# Patient Record
Sex: FEMALE | Race: WHITE | NOT HISPANIC OR LATINO | Employment: OTHER | URBAN - METROPOLITAN AREA
[De-identification: names, ages, dates, MRNs, and addresses within clinical notes are randomized per-mention and may not be internally consistent; named-entity substitution may affect disease eponyms.]

---

## 2019-05-28 ENCOUNTER — MEDICAL CORRESPONDENCE (OUTPATIENT)
Dept: HEALTH INFORMATION MANAGEMENT | Facility: CLINIC | Age: 29
End: 2019-05-28

## 2019-09-13 ENCOUNTER — MEDICAL CORRESPONDENCE (OUTPATIENT)
Dept: HEALTH INFORMATION MANAGEMENT | Facility: CLINIC | Age: 29
End: 2019-09-13

## 2019-10-15 ENCOUNTER — TELEPHONE (OUTPATIENT)
Dept: PEDIATRICS | Facility: CLINIC | Age: 29
End: 2019-10-15

## 2019-10-28 ENCOUNTER — OFFICE VISIT (OUTPATIENT)
Dept: PEDIATRICS | Facility: CLINIC | Age: 29
End: 2019-10-28
Attending: GENETIC COUNSELOR, MS
Payer: COMMERCIAL

## 2019-10-28 DIAGNOSIS — Z86.69 HISTORY OF CHIARI MALFORMATION: ICD-10-CM

## 2019-10-28 DIAGNOSIS — F79 INTELLECTUAL DISABILITY: ICD-10-CM

## 2019-10-28 DIAGNOSIS — Q76.1 KLIPPEL-FEIL SYNDROME: ICD-10-CM

## 2019-10-28 DIAGNOSIS — Z86.69 H/O MOVEMENT DISORDER: ICD-10-CM

## 2019-10-28 DIAGNOSIS — F84.0 AUTISM: ICD-10-CM

## 2019-10-28 DIAGNOSIS — R62.52 SHORT STATURE: ICD-10-CM

## 2019-10-28 DIAGNOSIS — R62.50 DEVELOPMENTAL DELAY: ICD-10-CM

## 2019-10-28 DIAGNOSIS — Z71.83 ENCOUNTER FOR NONPROCREATIVE GENETIC COUNSELING: ICD-10-CM

## 2019-10-28 DIAGNOSIS — N25.89 RENAL TUBULAR ACIDOSIS: ICD-10-CM

## 2019-10-28 DIAGNOSIS — Z87.898 HISTORY OF SEIZURES: Primary | ICD-10-CM

## 2019-10-28 DIAGNOSIS — E87.20 ACIDOSIS: Primary | ICD-10-CM

## 2019-10-28 DIAGNOSIS — E55.9 VITAMIN D DEFICIENCY: Primary | ICD-10-CM

## 2019-10-28 LAB
ANION GAP SERPL CALCULATED.3IONS-SCNC: 7 MMOL/L (ref 3–14)
BUN SERPL-MCNC: 18 MG/DL (ref 7–30)
CALCIUM SERPL-MCNC: 9.8 MG/DL (ref 8.5–10.1)
CHLORIDE SERPL-SCNC: 103 MMOL/L (ref 94–109)
CO2 SERPL-SCNC: 31 MMOL/L (ref 20–32)
CREAT SERPL-MCNC: 0.49 MG/DL (ref 0.52–1.04)
GFR SERPL CREATININE-BSD FRML MDRD: >90 ML/MIN/{1.73_M2}
GLUCOSE SERPL-MCNC: 99 MG/DL (ref 70–99)
POTASSIUM SERPL-SCNC: 4 MMOL/L (ref 3.4–5.3)
SODIUM SERPL-SCNC: 141 MMOL/L (ref 133–144)

## 2019-10-28 PROCEDURE — 40000803 ZZHCL STATISTIC DNA ISOL HIGH PURITY: Performed by: PEDIATRICS

## 2019-10-28 PROCEDURE — 36415 COLL VENOUS BLD VENIPUNCTURE: CPT | Performed by: PEDIATRICS

## 2019-10-28 PROCEDURE — 82306 VITAMIN D 25 HYDROXY: CPT | Performed by: INTERNAL MEDICINE

## 2019-10-28 PROCEDURE — 96040 ZZH GENETIC COUNSELING, EACH 30 MINUTES: CPT | Mod: ZF | Performed by: GENETIC COUNSELOR, MS

## 2019-10-28 PROCEDURE — 80048 BASIC METABOLIC PNL TOTAL CA: CPT | Performed by: INTERNAL MEDICINE

## 2019-10-28 NOTE — PROVIDER NOTIFICATION
10/28/19 1548   Child Life   [facility])   Intervention Supportive Check In;Family Support   Procedure Support Comment Lab draw for patients coping plan: Father lays over patient's legs & mom sings to her while supporting her upper body. Parents decline any additional support. Required another strong lab campos.    Family Support Comment Parents accompanied patient. Family is from [facility].    Concerns About Development yes  (Patient has developmental delays, autism, movement disorder. )   Anxiety Severe Anxiety   Able to Shift Focus From Anxiety Difficult   Outcomes/Follow Up Continue to Follow/Support

## 2019-10-28 NOTE — PROVIDER NOTIFICATION
10/28/19 1548   Child Life   [facility])   Intervention Supportive Check In;Family Support   Procedure Support Comment Lab draw for patients coping plan: Father lays over patient's legs & mom sings to her while supporting her upper body. Parents decline any additional support. Required another strong lab campos.    Family Support Comment Parents accompanied patient. Family is from [facility].    Concerns About Development yes  (Patient has developmental delays, autism, movement disorder. )   Anxiety Severe Anxiety   Able to Shift Focus From Anxiety Difficult   Outcomes/Follow Up Continue to Follow/Support

## 2019-10-29 LAB
COPATH REPORT: NORMAL
DEPRECATED CALCIDIOL+CALCIFEROL SERPL-MC: 77 UG/L (ref 20–75)

## 2019-10-29 NOTE — PROGRESS NOTES
"Presenting Information:  Natalya Hewitt is a 29-year-old woman with a history of short stature, intellectual disability, seizures, Klippel-Feil anomaly, and a Chiari malformation.  I initially saw Natalya in 2016 with Dr. Davey Doshi, at which time genetic testing was recommended but declined by the family.  Since that time, Natalya's parents are now feeling ready to pursue additional evaluations and therefore returned for genetic counseling and testing today.  During our visit a personal and family history was updated, possible genetic contributions to Natalya's challenges were reviewed, and informed consent for genetic testing was obtained.     Personal History:  Natalya was born after a normal, health pregnancy.  Concern for her development began around 6-9 months of age when her milestones were delayed.  Natalya currently has limited speech, knowing a few words she will say when prompted.  She has been given a diagnosis of autism.  She is able to walk independently but has an abnormal gait.  Natalya has never had any regression.  In childhood she had chronic ear and sinus infections.  She also had asthma when she was younger but this has resolved.  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  She had her first period at 19 years of age.  Natalya's height is 4'4\".  She has had abnormal EEGs and has a history of seizures.  Since the family's last visit Natalya has experienced a grand mal seizure after a long period with no obvious seizures.  She is now on Keppra.  Another new diagnoses since our last visit is renal tubular acidosis.       Family History:  A detailed pedigree was obtained previously  No significant updates were reported today.      Discussion:  Today I reviewed with the family that  genes are long stretches of DNA that are responsible for how our bodies look and how our bodies work.  Our genes are inherited on structures called chromosomes of which we " have 23 pairs.  The first 22 pairs are the same in males and females while the 23rd pair, the sex chromosomes (X and Y), are different in males and females.  Males have one copy of the X-chromosome and one copy of the Y-chromosome while females have two copies of the X-chromosome.  Changes in the DNA sequence of a gene, called mutations, as well as changes within the chromosomes can cause the signs and symptoms of a genetic condition.     Natalya has had a number of genetic tests previously to try to identify an underlying cause for her challenges.  This has included a karyotype, FISH for 15q11.2, fragile-X testing, and sequencing for Rett syndrome.  This testing has all returned normal.  At Natalya's last visit with myself and Dr. Doshi we recommended additional genetic testing.  We first recommended a chromosomal microarray (CGH and SNP) which is more detailed chromosome study than the karyotype done previously.  This looks for smaller missing or extra pieces of the chromosomes as well as areas of the chromosomes that are too similar to one another.  This is considered standard of care for an individual with intellectual disability, developmental delay, and/or autism, and this continues to be our first recommendation.  If the chromosomal microarray returns negative (normal), we will then recommend a panel of genes associated with intellectual disability including genes referred to as Angelman-like, which is consistent with Natalya's clinical features.      Genetic testing for Natalya is medically necessary as it will have implications for her health and healthcare. For example, it is possible that an underlying cause may also predispose Natalya to other health risks.  Knowing about these additional health risks can help us stay ahead of her healthcare to more appropriately screen for other complications.  Some diagnoses may also help guide treatment recommendations.  Secondly, discovering an underlying reason may  help predict the chance for other family members to have another child with similar healthcare needs.  Finally, having a specific underlying diagnosis may help Natalya receive the services she needs.      We reviewed the potential costs, limitations, and benefits of testing including the possibility of a positive, negative, or uncertain result and the family provided written informed consent for this.  If negative, I will recommend an updated visit with a  and likely whole exome sequencing.  The nature of this test was reviewed briefly.  We also discussed insurance coverage for testing and on the family's behalf we will submit a prior authorization for coverage of both tests.  Testing will be drawn today but remain on hold until approval is obtained.  At the family's request several labs ordered by Natalya's other care providers were also ordered to minimize the number of blood draws which are quite traumatic for Natalya.      I appreciate the opportunity to be a part of Natalya's care again today.  My direct contact information was shared and the family was encouraged to contact me with any additional questions or concerns.      Plan:  1.  A prior authorization for genetic testing will be submitted  2.  Once approved, CGH/SNP array  3.  If negative, reflex to a panel of genes associated with Angelman-like syndrome and intellectual disability       Glory Bell [facility]  Genetic Counselor  Division of Genetics and Metabolism    Total time spent in consultation with the family was approximately 60 minutes    Cc: No letter

## 2019-11-20 ENCOUNTER — TELEPHONE (OUTPATIENT)
Dept: CONSULT | Facility: CLINIC | Age: 29
End: 2019-11-20

## 2019-11-20 NOTE — TELEPHONE ENCOUNTER
I returned a telephone call from Natalya's mother Sasha regarding the plan for Natalya's genetic testing.  Unfortunately we have received a denial for Natalya's CGH/SNP array and the intellectual disability panel.  The Angelman syndrome like panel has been approved.  We will therefore begin with just this panel, and appeal the others in the meantime.  Sasha was in agreement with this plan.  Sasha also inquired about Natalya's vitamin D level which was collected recently.  A copy of this report will be sent to the family by mail.  I will contact the family again once Natalya's results return in approximately 4-6 weeks.        Glory Bell MS [facility]  Genetic Counselor  Division of Genetics and Metabolism

## 2019-11-21 DIAGNOSIS — F84.0 AUTISM: ICD-10-CM

## 2019-11-21 DIAGNOSIS — Z87.898 HISTORY OF SEIZURES: Primary | ICD-10-CM

## 2019-11-21 DIAGNOSIS — F79 INTELLECTUAL DISABILITY: ICD-10-CM

## 2019-11-21 DIAGNOSIS — R62.50 DEVELOPMENTAL DELAY: ICD-10-CM

## 2019-11-21 DIAGNOSIS — Z86.69 H/O MOVEMENT DISORDER: ICD-10-CM

## 2019-11-21 DIAGNOSIS — Q76.1 KLIPPEL-FEIL SYNDROME: ICD-10-CM

## 2019-11-21 DIAGNOSIS — R62.52 SHORT STATURE: ICD-10-CM

## 2019-11-21 DIAGNOSIS — Z86.69 HISTORY OF CHIARI MALFORMATION: ICD-10-CM

## 2019-11-21 PROCEDURE — 81479 UNLISTED MOLECULAR PATHOLOGY: CPT | Performed by: PEDIATRICS

## 2019-11-21 PROCEDURE — 81302 MECP2 GENE FULL SEQ: CPT | Performed by: PEDIATRICS

## 2019-11-21 PROCEDURE — 81405 MOPATH PROCEDURE LEVEL 6: CPT | Performed by: PEDIATRICS

## 2019-11-21 PROCEDURE — 81406 MOPATH PROCEDURE LEVEL 7: CPT | Performed by: PEDIATRICS

## 2019-11-21 PROCEDURE — 81404 MOPATH PROCEDURE LEVEL 5: CPT | Performed by: PEDIATRICS

## 2019-11-21 PROCEDURE — 81403 MOPATH PROCEDURE LEVEL 4: CPT | Performed by: PEDIATRICS

## 2019-12-06 ENCOUNTER — TELEPHONE (OUTPATIENT)
Dept: CONSULT | Facility: CLINIC | Age: 29
End: 2019-12-06

## 2019-12-06 LAB — COPATH REPORT: NORMAL

## 2019-12-06 NOTE — TELEPHONE ENCOUNTER
I contacted Natalya's parents and spoke to her mother Sasha to discuss the results of her recent genetic testing.  Due to insurance reasons, we were only able to proceed with the Angelman-like/Rett syndrome panel.  This has returned negative (normal).  An appeal is pending for the additional recommended testing and we will contact the family again once we have an update from their insurance company.      Glory Bell MS [facility]  Genetic Counselor  Division of Genetics and Metabolism

## 2020-01-03 ENCOUNTER — TELEPHONE (OUTPATIENT)
Dept: CONSULT | Facility: CLINIC | Age: 30
End: 2020-01-03

## 2020-01-03 ENCOUNTER — HOSPITAL ENCOUNTER (OUTPATIENT)
Facility: CLINIC | Age: 30
Setting detail: SPECIMEN
Discharge: HOME | End: 2020-01-03
Admitting: PEDIATRICS
Payer: COMMERCIAL

## 2020-01-03 DIAGNOSIS — F79 INTELLECTUAL DISABILITY: ICD-10-CM

## 2020-01-03 DIAGNOSIS — F84.0 AUTISM: ICD-10-CM

## 2020-01-03 DIAGNOSIS — Q76.1 KLIPPEL-FEIL SYNDROME: ICD-10-CM

## 2020-01-03 DIAGNOSIS — N25.89 RENAL TUBULAR ACIDOSIS: ICD-10-CM

## 2020-01-03 DIAGNOSIS — Z86.69 HISTORY OF CHIARI MALFORMATION: ICD-10-CM

## 2020-01-03 DIAGNOSIS — Z87.898 HISTORY OF SEIZURES: Primary | ICD-10-CM

## 2020-01-03 DIAGNOSIS — R62.52 SHORT STATURE: ICD-10-CM

## 2020-01-03 DIAGNOSIS — Z86.69 H/O MOVEMENT DISORDER: ICD-10-CM

## 2020-01-03 DIAGNOSIS — R62.50 DEVELOPMENTAL DELAY: ICD-10-CM

## 2020-01-03 PROCEDURE — 81229 CYTOG ALYS CHRML ABNR SNPCGH: CPT | Performed by: PEDIATRICS

## 2020-01-03 PROCEDURE — 40000803 ZZHCL STATISTIC DNA ISOL HIGH PURITY: Performed by: PEDIATRICS

## 2020-01-03 NOTE — TELEPHONE ENCOUNTER
At Natalya's mother Sasha's request I contacted her to review the CGH/SNP array and questions she had about this.  All questions were answered and the family felt comfortable proceeding.  I will contact the family again when results return in approximately 3 weeks.      Glory Bell MS [facility] [place]  Genetic Counselor  Division of Genetics and Metabolism

## 2020-01-03 NOTE — TELEPHONE ENCOUNTER
Notified Sasha, patient's mother, that we received prior authorization approval for genetic testing. Valid from 10/31/2019 to 10/30/2020. Authorization number is [id number].     Explained that insurance benefits may still apply, therefore, there could be an out of pocket cost.    Sasha had questions regarding CGH testing and would like to hold off on testing until further clarification is given. Golry has been notified.    JANIS Beach  Genomics Billing    [facility]   Molecular Diagnostics Laboratory  [address] [place] [id number] [phone]

## 2020-01-14 LAB — COPATH REPORT: NORMAL

## 2020-01-15 ENCOUNTER — TELEPHONE (OUTPATIENT)
Dept: CONSULT | Facility: CLINIC | Age: 30
End: 2020-01-15

## 2020-01-15 NOTE — TELEPHONE ENCOUNTER
I contacted Natalya's mother Sasha to discuss the results of Natalya's recent chromosomal microarray (CGH and SNP).  This has returned normal with no missing or extra pieces of the chromosomes nor any areas of the chromosomes that are too similar to one another.  We had initially planned to pursue an intellectual disability panel for Natalya but this has unfortunately been denied by the family's insurance company.      Given Natalya's previously normal Angelman/Rett-like syndromes panel and microarray, perhaps the next best step is instead whole exome sequencing (PEPITO).  I reviewed basics about this test and will send the family written information about PEPIOT.  If the family wishes to proceed I would recommend they re-establish care with an MD  the same day for additional phenotyping, as Natalya has not seen a  since 2016.  Sasha expressed understanding and will discuss with Natalya's father.  She is encouraged to contact me directly when they have made a decision.       Glory Bell [place] [facility]  Genetic Counselor  Division of Genetics and Metabolism

## 2022-07-29 ENCOUNTER — DOCUMENTATION ONLY (OUTPATIENT)
Dept: OTHER | Facility: CLINIC | Age: 32
End: 2022-07-29

## 2022-07-29 ENCOUNTER — LAB (OUTPATIENT)
Dept: LAB | Facility: HOSPITAL | Age: 32
End: 2022-07-29
Payer: COMMERCIAL

## 2022-07-29 DIAGNOSIS — E67.3 HIGH VITAMIN D LEVEL: ICD-10-CM

## 2022-07-29 DIAGNOSIS — E87.20 ACIDOSIS: Primary | ICD-10-CM

## 2022-07-29 DIAGNOSIS — E87.6 HYPOKALEMIA: ICD-10-CM

## 2022-07-29 DIAGNOSIS — G40.909 NONINTRACTABLE EPILEPSY WITHOUT STATUS EPILEPTICUS, UNSPECIFIED EPILEPSY TYPE (H): ICD-10-CM

## 2022-07-29 LAB
ALBUMIN SERPL-MCNC: 4.9 G/DL (ref 3.5–5)
ANION GAP SERPL CALCULATED.3IONS-SCNC: 16 MMOL/L (ref 5–18)
BASE EXCESS BLDV CALC-SCNC: 2.4 MMOL/L
BUN SERPL-MCNC: 20 MG/DL (ref 8–22)
CALCIUM SERPL-MCNC: 10.3 MG/DL (ref 8.5–10.5)
CHLORIDE BLD-SCNC: 102 MMOL/L (ref 98–107)
CO2 SERPL-SCNC: 25 MMOL/L (ref 22–31)
CREAT SERPL-MCNC: 0.81 MG/DL (ref 0.6–1.1)
GFR SERPL CREATININE-BSD FRML MDRD: >90 ML/MIN/1.73M2
GLUCOSE BLD-MCNC: 100 MG/DL (ref 70–125)
HCO3 BLDV-SCNC: 23 MMOL/L (ref 24–30)
OXYHGB MFR BLDV: 30.4 % (ref 70–75)
PCO2 BLDV: 58 MM HG (ref 35–50)
PH BLDV: 7.31 [PH] (ref 7.35–7.45)
PHOSPHATE SERPL-MCNC: 4.1 MG/DL (ref 2.5–4.5)
PO2 BLDV: 24 MM HG (ref 25–47)
POTASSIUM BLD-SCNC: 4.1 MMOL/L (ref 3.5–5)
SAO2 % BLDV: 30.7 % (ref 70–75)
SODIUM SERPL-SCNC: 143 MMOL/L (ref 136–145)

## 2022-07-29 PROCEDURE — 82306 VITAMIN D 25 HYDROXY: CPT

## 2022-07-29 PROCEDURE — 80177 DRUG SCRN QUAN LEVETIRACETAM: CPT

## 2022-07-29 PROCEDURE — 82805 BLOOD GASES W/O2 SATURATION: CPT

## 2022-07-29 PROCEDURE — 80069 RENAL FUNCTION PANEL: CPT

## 2022-07-29 PROCEDURE — 36415 COLL VENOUS BLD VENIPUNCTURE: CPT

## 2022-07-30 LAB
DEPRECATED CALCIDIOL+CALCIFEROL SERPL-MC: 65 UG/L (ref 20–75)
LEVETIRACETAM SERPL-MCNC: 17.6 ΜG/ML (ref 10–40)

## 2022-08-07 ENCOUNTER — HEALTH MAINTENANCE LETTER (OUTPATIENT)
Age: 32
End: 2022-08-07

## 2022-10-29 ENCOUNTER — HEALTH MAINTENANCE LETTER (OUTPATIENT)
Age: 32
End: 2022-10-29

## 2023-04-15 ENCOUNTER — LAB (OUTPATIENT)
Dept: LAB | Facility: HOSPITAL | Age: 33
End: 2023-04-15
Payer: COMMERCIAL

## 2023-04-15 DIAGNOSIS — Z79.899 NEED FOR PROPHYLACTIC CHEMOTHERAPY: ICD-10-CM

## 2023-04-15 DIAGNOSIS — E87.20 ACIDOSIS: Primary | ICD-10-CM

## 2023-04-15 DIAGNOSIS — E87.6 HYPOPOTASSEMIA: ICD-10-CM

## 2023-04-15 LAB
ALBUMIN SERPL BCG-MCNC: 4.7 G/DL (ref 3.5–5.2)
ALBUMIN UR-MCNC: 600 MG/DL
ALP SERPL-CCNC: 95 U/L (ref 35–104)
ALT SERPL W P-5'-P-CCNC: 16 U/L (ref 10–35)
AMORPH CRY #/AREA URNS HPF: ABNORMAL /HPF
ANION GAP SERPL CALCULATED.3IONS-SCNC: 14 MMOL/L (ref 7–15)
APPEARANCE UR: CLEAR
AST SERPL W P-5'-P-CCNC: 20 U/L (ref 10–35)
BACTERIA #/AREA URNS HPF: ABNORMAL /HPF
BASE EXCESS BLDV CALC-SCNC: -2.3 MMOL/L
BASOPHILS # BLD AUTO: 0.1 10E3/UL (ref 0–0.2)
BASOPHILS NFR BLD AUTO: 1 %
BILIRUB DIRECT SERPL-MCNC: <0.2 MG/DL (ref 0–0.3)
BILIRUB SERPL-MCNC: 0.3 MG/DL
BILIRUB UR QL STRIP: NEGATIVE
BUN SERPL-MCNC: 15.4 MG/DL (ref 6–20)
CA PHOS CRY #/AREA URNS HPF: ABNORMAL /HPF
CALCIUM SERPL-MCNC: 10 MG/DL (ref 8.6–10)
CHLORIDE SERPL-SCNC: 102 MMOL/L (ref 98–107)
COLOR UR AUTO: YELLOW
CREAT SERPL-MCNC: 0.66 MG/DL (ref 0.51–0.95)
DEPRECATED HCO3 PLAS-SCNC: 26 MMOL/L (ref 22–29)
EOSINOPHIL # BLD AUTO: 0.4 10E3/UL (ref 0–0.7)
EOSINOPHIL NFR BLD AUTO: 4 %
ERYTHROCYTE [DISTWIDTH] IN BLOOD BY AUTOMATED COUNT: 12.5 % (ref 10–15)
GFR SERPL CREATININE-BSD FRML MDRD: >90 ML/MIN/1.73M2
GLUCOSE SERPL-MCNC: 113 MG/DL (ref 70–99)
GLUCOSE UR STRIP-MCNC: NEGATIVE MG/DL
HCO3 BLDV-SCNC: 25 MMOL/L (ref 24–30)
HCT VFR BLD AUTO: 46.3 % (ref 35–47)
HGB BLD-MCNC: 15.6 G/DL (ref 11.7–15.7)
HGB UR QL STRIP: NEGATIVE
IMM GRANULOCYTES # BLD: 0 10E3/UL
IMM GRANULOCYTES NFR BLD: 0 %
KETONES UR STRIP-MCNC: 10 MG/DL
LEUKOCYTE ESTERASE UR QL STRIP: ABNORMAL
LEVETIRACETAM SERPL-MCNC: 30.9 ΜG/ML (ref 10–40)
LYMPHOCYTES # BLD AUTO: 3.4 10E3/UL (ref 0.8–5.3)
LYMPHOCYTES NFR BLD AUTO: 37 %
MAGNESIUM SERPL-MCNC: 2.4 MG/DL (ref 1.7–2.3)
MCH RBC QN AUTO: 33.2 PG (ref 26.5–33)
MCHC RBC AUTO-ENTMCNC: 33.7 G/DL (ref 31.5–36.5)
MCV RBC AUTO: 99 FL (ref 78–100)
MONOCYTES # BLD AUTO: 0.9 10E3/UL (ref 0–1.3)
MONOCYTES NFR BLD AUTO: 10 %
NEUTROPHILS # BLD AUTO: 4.5 10E3/UL (ref 1.6–8.3)
NEUTROPHILS NFR BLD AUTO: 48 %
NITRATE UR QL: NEGATIVE
NRBC # BLD AUTO: 0 10E3/UL
NRBC BLD AUTO-RTO: 0 /100
OXYHGB MFR BLDV: 68.1 % (ref 70–75)
PCO2 BLDV: 59 MM HG (ref 35–50)
PH BLDV: 7.24 [PH] (ref 7.35–7.45)
PH UR STRIP: 8.5 [PH] (ref 5–7)
PHOSPHATE SERPL-MCNC: 4.1 MG/DL (ref 2.5–4.5)
PLATELET # BLD AUTO: 307 10E3/UL (ref 150–450)
PO2 BLDV: 44 MM HG (ref 25–47)
POTASSIUM SERPL-SCNC: 4.5 MMOL/L (ref 3.4–5.3)
PROT SERPL-MCNC: 8.1 G/DL (ref 6.4–8.3)
RBC # BLD AUTO: 4.7 10E6/UL (ref 3.8–5.2)
RBC URINE: <1 /HPF
SAO2 % BLDV: 69.1 % (ref 70–75)
SODIUM SERPL-SCNC: 142 MMOL/L (ref 136–145)
SP GR UR STRIP: 1.02 (ref 1–1.03)
SQUAMOUS EPITHELIAL: 3 /HPF
UROBILINOGEN UR STRIP-MCNC: <2 MG/DL
WBC # BLD AUTO: 9.2 10E3/UL (ref 4–11)
WBC URINE: 5 /HPF

## 2023-04-15 PROCEDURE — 87186 SC STD MICRODIL/AGAR DIL: CPT

## 2023-04-15 PROCEDURE — 84100 ASSAY OF PHOSPHORUS: CPT

## 2023-04-15 PROCEDURE — 80177 DRUG SCRN QUAN LEVETIRACETAM: CPT

## 2023-04-15 PROCEDURE — 80053 COMPREHEN METABOLIC PANEL: CPT

## 2023-04-15 PROCEDURE — 81001 URINALYSIS AUTO W/SCOPE: CPT

## 2023-04-15 PROCEDURE — 82248 BILIRUBIN DIRECT: CPT

## 2023-04-15 PROCEDURE — 36415 COLL VENOUS BLD VENIPUNCTURE: CPT

## 2023-04-15 PROCEDURE — 82805 BLOOD GASES W/O2 SATURATION: CPT

## 2023-04-15 PROCEDURE — 83735 ASSAY OF MAGNESIUM: CPT

## 2023-04-15 PROCEDURE — 85025 COMPLETE CBC W/AUTO DIFF WBC: CPT

## 2023-04-16 LAB — BACTERIA UR CULT: ABNORMAL

## 2023-05-05 ENCOUNTER — LAB (OUTPATIENT)
Dept: LAB | Facility: HOSPITAL | Age: 33
End: 2023-05-05
Payer: COMMERCIAL

## 2023-05-05 DIAGNOSIS — E87.20 ACIDOSIS: Primary | ICD-10-CM

## 2023-05-05 DIAGNOSIS — N39.0 UTI (URINARY TRACT INFECTION): ICD-10-CM

## 2023-05-05 LAB
ALBUMIN UR-MCNC: 20 MG/DL
APPEARANCE UR: CLEAR
BILIRUB UR QL STRIP: NEGATIVE
COLOR UR AUTO: ABNORMAL
GLUCOSE UR STRIP-MCNC: NEGATIVE MG/DL
HGB UR QL STRIP: NEGATIVE
KETONES UR STRIP-MCNC: NEGATIVE MG/DL
LEUKOCYTE ESTERASE UR QL STRIP: NEGATIVE
NITRATE UR QL: NEGATIVE
PH UR STRIP: 8.5 [PH] (ref 5–7)
RBC URINE: 0 /HPF
SP GR UR STRIP: 1.01 (ref 1–1.03)
SQUAMOUS EPITHELIAL: <1 /HPF
UROBILINOGEN UR STRIP-MCNC: <2 MG/DL
WBC URINE: <1 /HPF

## 2023-05-05 PROCEDURE — 87086 URINE CULTURE/COLONY COUNT: CPT

## 2023-05-05 PROCEDURE — 81003 URINALYSIS AUTO W/O SCOPE: CPT

## 2023-05-07 LAB — BACTERIA UR CULT: NO GROWTH

## 2023-08-01 ENCOUNTER — TELEPHONE (OUTPATIENT)
Dept: CONSULT | Facility: CLINIC | Age: 33
End: 2023-08-01
Payer: COMMERCIAL

## 2023-08-01 NOTE — TELEPHONE ENCOUNTER
[facility] Call Center    Phone Message    May a detailed message be left on voicemail: yes     Reason for Call: Other: Mother called today looking to speak with Glory Bell regarding patients genetics testing that was done a couple of years ago. Mother states that they recently discovered a genetic condition that looks similar to Natalya's (KCNMA1). Mother is wondering if this genetic condition was screened when Natalya had the genetic testing. Mother would like a call back please. Thank you.     Action Taken: Other: PEDS GENETICS     Travel Screening: Not Applicable                                            At the request of the pediatric call center, I contacted Natalya's mother Sasha to address her above question.  During our call I reviewed that when Natalya was last seen we pursued genetic testing for a panel of genes associated with Angelman syndrome/Angelman-like syndrome/Rett syndrome; this did not include the KCNMA1 gene.  We also pursued a chromosomal microarray.  Both tests returned negative (normal).  At that time we recommended a more comprehensive genetic test called exome sequencing.  The family declined further testing at that time.      Today we revisited the option of further testing.  While the KCNMA1 gene may be a good fit, there are many other similar genes that would also be worth analysis and if further testing is pursued, I would continue to recommend exome sequencing over single gene analysis.  We reviewed the nature of this test and the recommendation that Natalya re-establish care with an MD .  Sasha expressed understanding and will continue to discuss with Natalya's father.  The family is encouraged to contact me directly with any further questions or if they would like to proceed with further evaluation.       Glory Bell [facility]  Genetic Counselor  Division of Genetics and Metabolism

## 2023-08-07 NOTE — TELEPHONE ENCOUNTER
Natalya's mother Sharon called  Glory back (Glory is out today) noting they are interested in scheduling this appointment with a  to discuss further genetic testing, but were unable to through the call center. A message was sent to our genetics'  asking her to reach out to the family to schedule, and I updated Sasha that she should hear from the  soon.      Pattie Casarez MS, [facility]  Genetic Counseling  Genetics and Metabolism Division  [facility] [place]   Phone: [phone]  Pager: [phone]  Email: [email or web address]

## 2023-08-08 NOTE — TELEPHONE ENCOUNTER
Spoke with patient's mom and assisted in scheduling soonest available appointment with Dr. Gutiérrez.     Future Appointments   Date Time Provider Department Center   11/15/2023  2:45 PM [facility] JELANI GENETIC COUNSELOR [facility]   11/15/2023  3:15 PM Koffi Gutiérrez MD [facility] CLIN

## 2023-09-02 ENCOUNTER — HEALTH MAINTENANCE LETTER (OUTPATIENT)
Age: 33
End: 2023-09-02

## 2023-09-13 ENCOUNTER — LAB (OUTPATIENT)
Dept: LAB | Facility: HOSPITAL | Age: 33
End: 2023-09-13
Payer: COMMERCIAL

## 2023-09-13 DIAGNOSIS — N39.0 UTI (URINARY TRACT INFECTION): Primary | ICD-10-CM

## 2023-09-13 LAB
ALBUMIN UR-MCNC: NEGATIVE MG/DL
APPEARANCE UR: CLEAR
BILIRUB UR QL STRIP: NEGATIVE
COLOR UR AUTO: YELLOW
GLUCOSE UR STRIP-MCNC: NEGATIVE MG/DL
HGB UR QL STRIP: NEGATIVE
KETONES UR STRIP-MCNC: NEGATIVE MG/DL
LEUKOCYTE ESTERASE UR QL STRIP: NEGATIVE
NITRATE UR QL: NEGATIVE
PH UR STRIP: 8.5 [PH] (ref 5–7)
SP GR UR STRIP: 1.01 (ref 1–1.03)
UROBILINOGEN UR STRIP-MCNC: <2 MG/DL

## 2023-09-13 PROCEDURE — 87086 URINE CULTURE/COLONY COUNT: CPT

## 2023-09-13 PROCEDURE — 81003 URINALYSIS AUTO W/O SCOPE: CPT

## 2023-09-15 LAB — BACTERIA UR CULT: NO GROWTH

## 2024-08-30 ENCOUNTER — LAB (OUTPATIENT)
Dept: LAB | Facility: HOSPITAL | Age: 34
End: 2024-08-30
Payer: COMMERCIAL

## 2024-08-30 DIAGNOSIS — G40.909 EPILEPSY (H): ICD-10-CM

## 2024-08-30 DIAGNOSIS — E87.20 ACIDOSIS: Primary | ICD-10-CM

## 2024-08-30 DIAGNOSIS — E55.9 AVITAMINOSIS D: ICD-10-CM

## 2024-08-30 LAB
ALBUMIN SERPL BCG-MCNC: 5.1 G/DL (ref 3.5–5.2)
ANION GAP SERPL CALCULATED.3IONS-SCNC: 14 MMOL/L (ref 7–15)
BASE EXCESS BLDV CALC-SCNC: -2.6 MMOL/L (ref -3–3)
BASOPHILS # BLD AUTO: 0.1 10E3/UL (ref 0–0.2)
BASOPHILS NFR BLD AUTO: 1 %
BUN SERPL-MCNC: 11.7 MG/DL (ref 6–20)
CALCIUM SERPL-MCNC: 10.1 MG/DL (ref 8.8–10.4)
CHLORIDE SERPL-SCNC: 104 MMOL/L (ref 98–107)
CREAT SERPL-MCNC: 0.67 MG/DL (ref 0.51–0.95)
EGFRCR SERPLBLD CKD-EPI 2021: >90 ML/MIN/1.73M2
EOSINOPHIL # BLD AUTO: 0.4 10E3/UL (ref 0–0.7)
EOSINOPHIL NFR BLD AUTO: 5 %
ERYTHROCYTE [DISTWIDTH] IN BLOOD BY AUTOMATED COUNT: 11.6 % (ref 10–15)
GLUCOSE SERPL-MCNC: 108 MG/DL (ref 70–99)
HCO3 BLDV-SCNC: 26 MMOL/L (ref 21–28)
HCO3 SERPL-SCNC: 25 MMOL/L (ref 22–29)
HCT VFR BLD AUTO: 45.1 % (ref 35–47)
HGB BLD-MCNC: 15.4 G/DL (ref 11.7–15.7)
IMM GRANULOCYTES # BLD: 0 10E3/UL
IMM GRANULOCYTES NFR BLD: 0 %
LACTATE SERPL-SCNC: 6.5 MMOL/L (ref 0.7–2)
LEVETIRACETAM SERPL-MCNC: 21 ΜG/ML (ref 10–40)
LYMPHOCYTES # BLD AUTO: 4.2 10E3/UL (ref 0.8–5.3)
LYMPHOCYTES NFR BLD AUTO: 49 %
MCH RBC QN AUTO: 33.7 PG (ref 26.5–33)
MCHC RBC AUTO-ENTMCNC: 34.1 G/DL (ref 31.5–36.5)
MCV RBC AUTO: 99 FL (ref 78–100)
MONOCYTES # BLD AUTO: 0.7 10E3/UL (ref 0–1.3)
MONOCYTES NFR BLD AUTO: 9 %
NEUTROPHILS # BLD AUTO: 3.1 10E3/UL (ref 1.6–8.3)
NEUTROPHILS NFR BLD AUTO: 37 %
NRBC # BLD AUTO: 0 10E3/UL
NRBC BLD AUTO-RTO: 0 /100
O2/TOTAL GAS SETTING VFR VENT: 21 %
OXYHGB MFR BLDV: 48 % (ref 70–75)
PCO2 BLDV: 66 MM HG (ref 40–50)
PH BLDV: 7.2 [PH] (ref 7.32–7.43)
PHOSPHATE SERPL-MCNC: 4.4 MG/DL (ref 2.5–4.5)
PLATELET # BLD AUTO: 347 10E3/UL (ref 150–450)
PO2 BLDV: 37 MM HG (ref 25–47)
POTASSIUM SERPL-SCNC: 4.5 MMOL/L (ref 3.4–5.3)
RBC # BLD AUTO: 4.57 10E6/UL (ref 3.8–5.2)
SAO2 % BLDV: 48.8 % (ref 70–75)
SODIUM SERPL-SCNC: 143 MMOL/L (ref 135–145)
VIT D+METAB SERPL-MCNC: 58 NG/ML (ref 20–50)
WBC # BLD AUTO: 8.6 10E3/UL (ref 4–11)

## 2024-08-30 PROCEDURE — 82306 VITAMIN D 25 HYDROXY: CPT

## 2024-08-30 PROCEDURE — 82805 BLOOD GASES W/O2 SATURATION: CPT

## 2024-08-30 PROCEDURE — 80177 DRUG SCRN QUAN LEVETIRACETAM: CPT

## 2024-08-30 PROCEDURE — 82040 ASSAY OF SERUM ALBUMIN: CPT

## 2024-08-30 PROCEDURE — 36415 COLL VENOUS BLD VENIPUNCTURE: CPT

## 2024-08-30 PROCEDURE — 85004 AUTOMATED DIFF WBC COUNT: CPT

## 2024-10-22 ENCOUNTER — LAB (OUTPATIENT)
Dept: LAB | Facility: HOSPITAL | Age: 34
End: 2024-10-22
Payer: COMMERCIAL

## 2024-10-22 DIAGNOSIS — E87.20 ACIDOSIS: Primary | ICD-10-CM

## 2024-10-22 DIAGNOSIS — N39.0 UTI (URINARY TRACT INFECTION): ICD-10-CM

## 2024-10-22 LAB
ALBUMIN UR-MCNC: 100 MG/DL
APPEARANCE UR: CLEAR
BACTERIA #/AREA URNS HPF: ABNORMAL /HPF
BILIRUB UR QL STRIP: NEGATIVE
COLOR UR AUTO: YELLOW
GLUCOSE UR STRIP-MCNC: NEGATIVE MG/DL
HGB UR QL STRIP: NEGATIVE
KETONES UR STRIP-MCNC: ABNORMAL MG/DL
LEUKOCYTE ESTERASE UR QL STRIP: ABNORMAL
NITRATE UR QL: NEGATIVE
PH UR STRIP: 8.5 [PH] (ref 5–7)
RBC URINE: <1 /HPF
SP GR UR STRIP: 1.02 (ref 1–1.03)
SQUAMOUS EPITHELIAL: 1 /HPF
UROBILINOGEN UR STRIP-MCNC: <2 MG/DL
WBC URINE: 4 /HPF

## 2024-10-22 PROCEDURE — 87186 SC STD MICRODIL/AGAR DIL: CPT

## 2024-10-22 PROCEDURE — 81001 URINALYSIS AUTO W/SCOPE: CPT

## 2024-10-23 LAB — BACTERIA UR CULT: ABNORMAL

## 2024-11-01 ENCOUNTER — LAB (OUTPATIENT)
Dept: LAB | Facility: HOSPITAL | Age: 34
End: 2024-11-01
Payer: COMMERCIAL

## 2024-11-01 DIAGNOSIS — E87.20 ACIDOSIS: Primary | ICD-10-CM

## 2024-11-01 DIAGNOSIS — N39.0 UTI (URINARY TRACT INFECTION): ICD-10-CM

## 2024-11-01 LAB
ALBUMIN UR-MCNC: 20 MG/DL
APPEARANCE UR: CLEAR
BILIRUB UR QL STRIP: NEGATIVE
COLOR UR AUTO: COLORLESS
GLUCOSE UR STRIP-MCNC: NEGATIVE MG/DL
HGB UR QL STRIP: NEGATIVE
KETONES UR STRIP-MCNC: NEGATIVE MG/DL
LEUKOCYTE ESTERASE UR QL STRIP: NEGATIVE
NITRATE UR QL: NEGATIVE
PH UR STRIP: 8.5 [PH] (ref 5–7)
RBC URINE: <1 /HPF
SP GR UR STRIP: 1.01 (ref 1–1.03)
SQUAMOUS EPITHELIAL: <1 /HPF
UROBILINOGEN UR STRIP-MCNC: <2 MG/DL
WBC URINE: 0 /HPF

## 2024-11-01 PROCEDURE — 87086 URINE CULTURE/COLONY COUNT: CPT

## 2024-11-01 PROCEDURE — 81001 URINALYSIS AUTO W/SCOPE: CPT

## 2024-11-02 LAB — BACTERIA UR CULT: NORMAL

## 2024-11-25 DIAGNOSIS — N39.0 URINARY TRACT INFECTION, SITE NOT SPECIFIED: Primary | ICD-10-CM

## 2024-11-25 DIAGNOSIS — E87.20 ACIDOSIS: ICD-10-CM

## 2025-05-28 ENCOUNTER — TELEPHONE (OUTPATIENT)
Dept: CONSULT | Facility: CLINIC | Age: 35
End: 2025-05-28
Payer: COMMERCIAL

## 2025-05-28 NOTE — TELEPHONE ENCOUNTER
I returned a telephone call from Natalya's mother, Sasha.  Natalya has been seeing a neurologist at the [facility] who is recommending genome sequencing.  Sasha had questions about Natalya's prior genetic testing and this additional test.  I explained Natalya's prior testing- a Rett/Angelman-like conditions panel as well as a chromosomal microarray (CGH/SNP).  Both returned normal.  We then recommended exome sequencing which the family initially declined.      The family then reached back out in 2023 feeling ready to pursue additional testing.  Since it had been several years since Natalya had last seen a , a visit was recommended prior to proceeding with further testing.  That appointment was not kept.  During today's call I reviewed that our recommendation for additional testing in 2019 was a test called exome sequencing.  Since that time technology has progressed and genome sequencing may now be the better option.  We would be happy to see Natalya here, or certainly support her continuing care at the [facility].  At the conclusion of our call Sasha expressed understanding of the information discussed and was encouraged to call back if she would like Natalya seen back here.  We remain available.       Glory Bell MS [facility]  Genetic Counselor  Division of Genetics and Metabolism

## 2025-06-16 ENCOUNTER — DOCUMENTATION ONLY (OUTPATIENT)
Dept: CONSULT | Facility: CLINIC | Age: 35
End: 2025-06-16
Payer: COMMERCIAL

## 2025-06-16 NOTE — TELEPHONE ENCOUNTER
Documentation Only    Natalya's mother Sasha e-mailed me a signed release of information form, requesting that records be sent to the [facility].  This has been completed.      Glory Bell MS [facility]  Genetic Counselor  Division of Genetics and Metabolism

## 2025-07-22 ENCOUNTER — LAB (OUTPATIENT)
Dept: LAB | Facility: HOSPITAL | Age: 35
End: 2025-07-22
Payer: MEDICAID

## 2025-07-22 DIAGNOSIS — N39.0 URINARY TRACT INFECTION, SITE NOT SPECIFIED: ICD-10-CM

## 2025-07-22 DIAGNOSIS — E87.20 ACIDOSIS: ICD-10-CM

## 2025-07-22 LAB
ALBUMIN UR-MCNC: 300 MG/DL
APPEARANCE UR: CLEAR
BACTERIA #/AREA URNS HPF: ABNORMAL /HPF
BILIRUB UR QL STRIP: NEGATIVE
COLOR UR AUTO: ABNORMAL
GLUCOSE UR STRIP-MCNC: NEGATIVE MG/DL
HGB UR QL STRIP: NEGATIVE
KETONES UR STRIP-MCNC: NEGATIVE MG/DL
LEUKOCYTE ESTERASE UR QL STRIP: NEGATIVE
MUCOUS THREADS #/AREA URNS LPF: PRESENT /LPF
NITRATE UR QL: NEGATIVE
PH UR STRIP: 8.5 [PH] (ref 5–7)
RBC URINE: <1 /HPF
SP GR UR STRIP: 1.02 (ref 1–1.03)
SQUAMOUS EPITHELIAL: 5 /HPF
TRANSITIONAL EPI: <1 /HPF
UROBILINOGEN UR STRIP-MCNC: NORMAL MG/DL
WBC URINE: 1 /HPF

## 2025-07-22 PROCEDURE — 87086 URINE CULTURE/COLONY COUNT: CPT

## 2025-07-22 PROCEDURE — 81001 URINALYSIS AUTO W/SCOPE: CPT

## 2025-07-23 LAB — BACTERIA UR CULT: ABNORMAL

## 2025-08-25 ENCOUNTER — LAB (OUTPATIENT)
Dept: LAB | Facility: HOSPITAL | Age: 35
End: 2025-08-25
Payer: MEDICAID

## 2025-08-25 DIAGNOSIS — N39.0 URINARY TRACT INFECTION, SITE NOT SPECIFIED: ICD-10-CM

## 2025-08-25 DIAGNOSIS — E87.20 ACIDOSIS: ICD-10-CM

## 2025-08-25 LAB
ALBUMIN UR-MCNC: 300 MG/DL
APPEARANCE UR: CLEAR
BACTERIA #/AREA URNS HPF: ABNORMAL /HPF
BILIRUB UR QL STRIP: NEGATIVE
COLOR UR AUTO: YELLOW
GLUCOSE UR STRIP-MCNC: NEGATIVE MG/DL
HGB UR QL STRIP: NEGATIVE
KETONES UR STRIP-MCNC: 10 MG/DL
LEUKOCYTE ESTERASE UR QL STRIP: NEGATIVE
NITRATE UR QL: NEGATIVE
PH UR STRIP: 8.5 [PH] (ref 5–7)
RBC URINE: <1 /HPF
SP GR UR STRIP: 1.02 (ref 1–1.03)
SQUAMOUS EPITHELIAL: <1 /HPF
UROBILINOGEN UR STRIP-MCNC: NORMAL MG/DL
WBC URINE: <1 /HPF

## 2025-08-25 PROCEDURE — 87086 URINE CULTURE/COLONY COUNT: CPT

## 2025-08-25 PROCEDURE — 81001 URINALYSIS AUTO W/SCOPE: CPT

## 2025-08-26 LAB — BACTERIA UR CULT: NORMAL
